# Patient Record
Sex: FEMALE | Race: WHITE
[De-identification: names, ages, dates, MRNs, and addresses within clinical notes are randomized per-mention and may not be internally consistent; named-entity substitution may affect disease eponyms.]

---

## 2020-12-24 ENCOUNTER — APPOINTMENT (OUTPATIENT)
Dept: PHYSICAL MEDICINE AND REHAB | Facility: CLINIC | Age: 55
End: 2020-12-24
Payer: MEDICAID

## 2020-12-24 VITALS
HEART RATE: 106 BPM | TEMPERATURE: 97 F | WEIGHT: 220 LBS | HEIGHT: 68 IN | SYSTOLIC BLOOD PRESSURE: 143 MMHG | DIASTOLIC BLOOD PRESSURE: 98 MMHG | BODY MASS INDEX: 33.34 KG/M2 | RESPIRATION RATE: 14 BRPM

## 2020-12-24 DIAGNOSIS — Z87.891 PERSONAL HISTORY OF NICOTINE DEPENDENCE: ICD-10-CM

## 2020-12-24 DIAGNOSIS — E11.9 TYPE 2 DIABETES MELLITUS W/OUT COMPLICATIONS: ICD-10-CM

## 2020-12-24 DIAGNOSIS — I10 ESSENTIAL (PRIMARY) HYPERTENSION: ICD-10-CM

## 2020-12-24 PROCEDURE — 99204 OFFICE O/P NEW MOD 45 MIN: CPT

## 2020-12-24 PROCEDURE — 99072 ADDL SUPL MATRL&STAF TM PHE: CPT

## 2020-12-24 RX ORDER — CLOPIDOGREL 75 MG/1
75 TABLET, FILM COATED ORAL
Refills: 0 | Status: ACTIVE | COMMUNITY

## 2020-12-24 RX ORDER — ALPRAZOLAM 0.5 MG/1
0.5 TABLET ORAL
Refills: 0 | Status: ACTIVE | COMMUNITY

## 2020-12-24 RX ORDER — ATORVASTATIN CALCIUM 40 MG/1
40 TABLET, FILM COATED ORAL
Refills: 0 | Status: ACTIVE | COMMUNITY

## 2020-12-24 RX ORDER — METOPROLOL SUCCINATE 50 MG/1
50 TABLET, EXTENDED RELEASE ORAL
Refills: 0 | Status: ACTIVE | COMMUNITY

## 2020-12-24 RX ORDER — HYDROCODONE BITARTRATE AND ACETAMINOPHEN 10; 325 MG/1; MG/1
10-325 TABLET ORAL
Refills: 0 | Status: ACTIVE | COMMUNITY
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
## 2020-12-24 NOTE — PHYSICAL EXAM
[FreeTextEntry1] : PE: Entire PE down with JENARO Ortiz in room\par Constitutional: In NAD, calm and cooperative\par HEENT: NCAT, Anicteric sclera, no lid-lag\par Cardio: Extremities appear pink and well perfused, no peripheral edema\par Respiratory: Normal respiratory effort on room air, no accessory muscle use\par Skin: no rashes seen on exposed skin, no visible abrasions\par Psych: Normal affect, intact judgment and insight\par Neuro: Awake, alert and oriented x 3, see below for focused neurological exam\par MSK (Neck):\par 	Inspection: no gross swelling identified\par 	Palpation: Tenderness of the bilateral lower cervical paraspinals\par 	ROM: Pain at end cervical extension/flexion but AROM intact\par 	Strength: 5/5 strength in bilateral upper extremities\par 	Reflexes: 2+ Biceps/Brachioradialis reflex bilaterally, Zuleta’s negative bilaterally\par 	Sensation: Intact to light touch in bilateral upper extremities except for slight decreased sensation to light touch in R lateral arm\par 	Special tests: Spurling’s test negative bilaterally \par \par MSK (Back)\par 	Inspection: no gross swelling identified\par 	Palpation: Tenderness of the bilateral lower lumbar paraspinals\par 	ROM: Pain at end lumbar flexion, no pain with extension\par 	Strength: 5/5 strength in bilateral lower extremities except for R hip flexion and knee extension which is 4/5\par 	Reflexes: 1+ Patella reflex bilaterally, 1+ Achilles reflex bilaterally, negative clonus bilaterally\par 	Sensation: Decreased to light touch in posterior/lateral portions of R thigh/leg compared to L\par Special tests:\par SLR:negative bilaterally\par AKBAR:negative bilaterally\par FADIR: negative bilaterally\par Facet loading: positive on the right
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
## 2021-01-22 ENCOUNTER — APPOINTMENT (OUTPATIENT)
Dept: PHYSICAL MEDICINE AND REHAB | Facility: CLINIC | Age: 56
End: 2021-01-22

## 2021-01-25 ENCOUNTER — APPOINTMENT (OUTPATIENT)
Dept: PHYSICAL MEDICINE AND REHAB | Facility: CLINIC | Age: 56
End: 2021-01-25

## 2021-09-13 ENCOUNTER — APPOINTMENT (OUTPATIENT)
Dept: PHYSICAL MEDICINE AND REHAB | Facility: CLINIC | Age: 56
End: 2021-09-13

## 2021-12-09 ENCOUNTER — APPOINTMENT (OUTPATIENT)
Dept: PHYSICAL MEDICINE AND REHAB | Facility: CLINIC | Age: 56
End: 2021-12-09
Payer: MEDICAID

## 2021-12-09 DIAGNOSIS — M54.2 CERVICALGIA: ICD-10-CM

## 2021-12-09 DIAGNOSIS — I25.10 ATHEROSCLEROTIC HEART DISEASE OF NATIVE CORONARY ARTERY W/OUT ANGINA PECTORIS: ICD-10-CM

## 2021-12-09 DIAGNOSIS — M79.18 MYALGIA, OTHER SITE: ICD-10-CM

## 2021-12-09 PROCEDURE — 99214 OFFICE O/P EST MOD 30 MIN: CPT

## 2021-12-09 PROCEDURE — 99072 ADDL SUPL MATRL&STAF TM PHE: CPT

## 2021-12-09 NOTE — PHYSICAL EXAM
[FreeTextEntry1] : PE:\par Constitutional: In NAD, calm and cooperative\par HEENT: NCAT, Anicteric sclera, no lid-lag\par Cardio: Extremities appear pink and well perfused, no peripheral edema\par Respiratory: Normal respiratory effort on room air, no accessory muscle use\par Skin: no rashes seen on exposed skin, no visible abrasions\par Psych: Normal affect, intact judgment and insight\par Neuro: Awake, alert and oriented x 3, see below for focused neurological exam\par MSK (Neck):\par 	Inspection: no gross swelling identified\par 	Palpation: Tenderness of the bilateral lower cervical paraspinals\par 	ROM: Pain at end cervical extension/flexion but AROM intact\par 	Strength: 5/5 strength in bilateral upper extremities\par 	Reflexes: 2+ Biceps/Brachioradialis reflex bilaterally, Zuleta’s negative bilaterally\par 	Sensation: Intact to light touch in bilateral upper extremities except for slight decreased sensation to light touch in R lateral arm\par 	Special tests: modified Spurling’s test negative bilaterally \par \par MSK (Back)\par 	Inspection: no gross swelling identified\par 	Palpation: Tenderness of the bilateral lower lumbar paraspinals\par 	ROM: Pain at end lumbar flexion, mild pain with extension\par 	Strength: 5/5 strength in bilateral lower extremities except for R hip flexion and knee extension which is 4/5\par 	Reflexes: 1+ Patella reflex bilaterally, 1+ Achilles reflex bilaterally, negative clonus bilaterally\par 	Sensation: Decreased to light touch in posterior/lateral portions of R thigh/leg compared to L\par Special tests:\par SLR:negative bilaterally\par AKBAR:negative bilaterally\par FADIR: negative bilaterally\par Facet loading: positive on the right
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
## 2022-02-09 ENCOUNTER — RESULT REVIEW (OUTPATIENT)
Age: 57
End: 2022-02-09

## 2022-02-10 ENCOUNTER — APPOINTMENT (OUTPATIENT)
Dept: PHYSICAL MEDICINE AND REHAB | Facility: CLINIC | Age: 57
End: 2022-02-10

## 2023-08-17 ENCOUNTER — APPOINTMENT (OUTPATIENT)
Dept: PHYSICAL MEDICINE AND REHAB | Facility: CLINIC | Age: 58
End: 2023-08-17
Payer: MEDICARE

## 2023-08-17 VITALS
WEIGHT: 218 LBS | SYSTOLIC BLOOD PRESSURE: 133 MMHG | BODY MASS INDEX: 33.04 KG/M2 | HEIGHT: 68 IN | DIASTOLIC BLOOD PRESSURE: 93 MMHG

## 2023-08-17 DIAGNOSIS — Z98.1 ARTHRODESIS STATUS: ICD-10-CM

## 2023-08-17 DIAGNOSIS — M25.561 PAIN IN RIGHT KNEE: ICD-10-CM

## 2023-08-17 DIAGNOSIS — M54.16 RADICULOPATHY, LUMBAR REGION: ICD-10-CM

## 2023-08-17 PROCEDURE — 99214 OFFICE O/P EST MOD 30 MIN: CPT
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
## 2023-08-17 NOTE — PHYSICAL EXAM
[FreeTextEntry1] : PE:Entire PE done with Sasha Carroll, DO in room Constitutional: In NAD, calm and cooperative MSK (Back) 	Inspection: no gross swelling identified 	Palpation: mild tenderness to right lumbar paraspinal  	ROM: Pain at end lumbar flexion, mild pain with extension 	Strength: 5/5 strength in bilateral lower extremities 	Reflexes: 1+ Patella reflex bilaterally, 1+ Achilles reflex bilaterally, negative clonus bilaterally 	Sensation: Decreased to light touch in posterior/lateral portions of R thigh/leg compared to L Special tests: SLR:negative bilaterally AKBAR:negative bilaterally FADIR: negative bilaterally  R Knee +Crepitus Negative Varus & Valgus Negative Lachman's Negative Anterior Drawer Negative Posterior Drawer Point tenderness along tibial tuberosity and mild tenderness of joint line of tib plateau

## 2023-11-13 DIAGNOSIS — Z87.81 PERSONAL HISTORY OF (HEALED) TRAUMATIC FRACTURE: ICD-10-CM

## 2023-12-01 ENCOUNTER — NON-APPOINTMENT (OUTPATIENT)
Age: 58
End: 2023-12-01